# Patient Record
Sex: FEMALE | Race: WHITE | NOT HISPANIC OR LATINO | Employment: FULL TIME | URBAN - METROPOLITAN AREA
[De-identification: names, ages, dates, MRNs, and addresses within clinical notes are randomized per-mention and may not be internally consistent; named-entity substitution may affect disease eponyms.]

---

## 2021-04-29 ENCOUNTER — TELEPHONE (OUTPATIENT)
Dept: OBGYN CLINIC | Facility: CLINIC | Age: 41
End: 2021-04-29

## 2022-05-20 DIAGNOSIS — Z01.419 ENCOUNTER FOR GYNECOLOGICAL EXAMINATION (GENERAL) (ROUTINE) WITHOUT ABNORMAL FINDINGS: ICD-10-CM

## 2022-05-24 ENCOUNTER — TELEPHONE (OUTPATIENT)
Dept: OBGYN CLINIC | Facility: CLINIC | Age: 42
End: 2022-05-24

## 2022-05-24 RX ORDER — ETONOGESTREL AND ETHINYL ESTRADIOL .12; .015 MG/D; MG/D
RING VAGINAL
Qty: 1 EACH | Refills: 2 | Status: SHIPPED | OUTPATIENT
Start: 2022-05-24 | End: 2022-08-03 | Stop reason: SDUPTHER

## 2022-05-24 NOTE — TELEPHONE ENCOUNTER
Jade called requesting refill of her birth control  Scheduled for well annual in august (8/3/2022) Confirmed pharmacy in chart

## 2022-08-03 ENCOUNTER — ANNUAL EXAM (OUTPATIENT)
Dept: OBGYN CLINIC | Facility: CLINIC | Age: 42
End: 2022-08-03
Payer: COMMERCIAL

## 2022-08-03 VITALS
DIASTOLIC BLOOD PRESSURE: 70 MMHG | SYSTOLIC BLOOD PRESSURE: 96 MMHG | WEIGHT: 188.4 LBS | BODY MASS INDEX: 29.57 KG/M2 | HEIGHT: 67 IN

## 2022-08-03 DIAGNOSIS — Z12.31 BREAST CANCER SCREENING BY MAMMOGRAM: ICD-10-CM

## 2022-08-03 DIAGNOSIS — Z12.4 ENCOUNTER FOR PAPANICOLAOU SMEAR FOR CERVICAL CANCER SCREENING: ICD-10-CM

## 2022-08-03 DIAGNOSIS — Z01.419 ENCOUNTER FOR GYNECOLOGICAL EXAMINATION WITHOUT ABNORMAL FINDING: Primary | ICD-10-CM

## 2022-08-03 DIAGNOSIS — Z30.011 ENCOUNTER FOR PRESCRIPTION OF ORAL CONTRACEPTIVES: ICD-10-CM

## 2022-08-03 PROCEDURE — S0612 ANNUAL GYNECOLOGICAL EXAMINA: HCPCS | Performed by: NURSE PRACTITIONER

## 2022-08-03 RX ORDER — ETONOGESTREL AND ETHINYL ESTRADIOL 11.7; 2.7 MG/1; MG/1
1 INSERT, EXTENDED RELEASE VAGINAL
Qty: 3 EACH | Refills: 3 | Status: SHIPPED | OUTPATIENT
Start: 2022-08-03

## 2022-08-03 RX ORDER — POTASSIUM CITRATE 15 MEQ/1
1 TABLET, EXTENDED RELEASE ORAL 2 TIMES DAILY
COMMUNITY
Start: 2022-07-18

## 2022-08-03 NOTE — PROGRESS NOTES
Assessment/Plan:  Calcium 1000 mg + 600-1000 IU Vit D daily  Pap with high risk HPV Q 3-5 years,  Annual mammogram, monthly breast self exam  Birth control as directed  Diagnoses and all orders for this visit:    Encounter for gynecological examination without abnormal finding  -     Cancel: IGP, Aptima HPV, Rfx 16/18,45  -     IGP, Aptima HPV, Rfx 16/18,45    Breast cancer screening by mammogram  -     Mammo screening bilateral w 3d & cad; Future    Encounter for prescription of oral contraceptives  -     etonogestrel-ethinyl estradiol (EluRyng) 0 12-0 015 MG/24HR vaginal ring; Insert 1 each into the vagina every 28 days Insert vaginally and leave in place for 3 consecutive weeks, then remove for 1 week  Encounter for Papanicolaou smear for cervical cancer screening  -     Cancel: IGP, Aptima HPV, Rfx 16/18,45  -     IGP, Aptima HPV, Rfx 16/18,45    Other orders  -     Potassium Citrate ER 15 MEQ (1620 MG) TBCR; Take 1 tablet by mouth 2 (two) times a day          Subjective:      Patient ID: Huber Hong is a 37 y o  female  Here for annual gyn Nuva ring PAP 7/2018 neg Periods monthly Still heavy and cramps No pain other times of the month No unusual discharge Not currently SA  The following portions of the patient's history were reviewed and updated as appropriate: allergies, current medications, past family history, past medical history, past social history, past surgical history and problem list     Review of Systems   Constitutional: Negative for fatigue and unexpected weight change  Gastrointestinal: Negative for abdominal distention, abdominal pain, constipation and diarrhea  Genitourinary: Negative for difficulty urinating, dyspareunia, dysuria, frequency, genital sores, menstrual problem, pelvic pain, urgency, vaginal bleeding, vaginal discharge and vaginal pain  Neurological: Negative for headaches  Psychiatric/Behavioral: Negative  Negative for dysphoric mood   The patient is not nervous/anxious  Objective:      BP 96/70 (BP Location: Left arm, Patient Position: Sitting, Cuff Size: Large)   Ht 5' 6 75" (1 695 m)   Wt 85 5 kg (188 lb 6 4 oz)   LMP 07/23/2022 (Exact Date)   Breastfeeding No   BMI 29 73 kg/m²          Physical Exam  Vitals and nursing note reviewed  Constitutional:       General: She is not in acute distress  Appearance: Normal appearance  HENT:      Head: Normocephalic and atraumatic  Pulmonary:      Effort: Pulmonary effort is normal    Chest:   Breasts: Breasts are symmetrical       Right: Normal  No mass, nipple discharge, skin change, tenderness or axillary adenopathy  Left: Normal  No mass, nipple discharge, skin change, tenderness or axillary adenopathy  Abdominal:      General: There is no distension  Palpations: Abdomen is soft  Tenderness: There is no abdominal tenderness  There is no guarding or rebound  Genitourinary:     General: Normal vulva  Exam position: Lithotomy position  Labia:         Right: No rash, tenderness, lesion or injury  Left: No rash, tenderness, lesion or injury  Urethra: No prolapse, urethral pain, urethral swelling or urethral lesion  Vagina: Normal  No erythema or lesions  Cervix: No cervical motion tenderness, discharge, lesion or cervical bleeding  Uterus: Normal        Adnexa: Right adnexa normal and left adnexa normal         Right: No mass or tenderness  Left: No mass or tenderness  Rectum: No mass or external hemorrhoid  Comments: PAP from cervix  Musculoskeletal:         General: Normal range of motion  Lymphadenopathy:      Upper Body:      Right upper body: No axillary adenopathy  Left upper body: No axillary adenopathy  Lower Body: No right inguinal adenopathy  No left inguinal adenopathy  Skin:     General: Skin is warm and dry     Neurological:      Mental Status: She is alert and oriented to person, place, and time  Psychiatric:         Mood and Affect: Mood normal          Behavior: Behavior normal          Thought Content:  Thought content normal          Judgment: Judgment normal

## 2022-08-03 NOTE — PATIENT INSTRUCTIONS
Calcium 1000 mg + 600-1000 IU Vit D daily  Pap with high risk HPV Q 3-5 years,  Annual mammogram, monthly breast self exam  Birth control as directed

## 2022-08-08 LAB
CYTOLOGIST CVX/VAG CYTO: NORMAL
DX ICD CODE: NORMAL
HPV I/H RISK 4 DNA CVX QL PROBE+SIG AMP: NEGATIVE
OTHER STN SPEC: NORMAL
PATH REPORT.FINAL DX SPEC: NORMAL
SL AMB NOTE:: NORMAL
SL AMB SPECIMEN ADEQUACY: NORMAL
SL AMB TEST METHODOLOGY: NORMAL

## 2022-08-17 ENCOUNTER — HOSPITAL ENCOUNTER (OUTPATIENT)
Dept: MAMMOGRAPHY | Facility: CLINIC | Age: 42
Discharge: HOME | End: 2022-08-17
Payer: COMMERCIAL

## 2022-08-17 VITALS — HEIGHT: 65 IN | WEIGHT: 187 LBS | BODY MASS INDEX: 31.16 KG/M2

## 2022-08-17 DIAGNOSIS — Z12.31 BREAST CANCER SCREENING BY MAMMOGRAM: ICD-10-CM

## 2022-08-17 PROCEDURE — 77067 SCR MAMMO BI INCL CAD: CPT

## 2022-08-17 PROCEDURE — 77063 BREAST TOMOSYNTHESIS BI: CPT

## 2023-01-03 ENCOUNTER — TELEPHONE (OUTPATIENT)
Dept: OBGYN CLINIC | Facility: CLINIC | Age: 43
End: 2023-01-03

## 2023-01-03 NOTE — TELEPHONE ENCOUNTER
Jade states was treated for an UTI approx  2-3 weeks ago;[facility]  She also has noted some brownish color vaginal discharge,been using Nuvaring continuously for at the past two months  Jade is still having some urinary frequency and discomfort  Jade is requesting an appt  To be checked,if has a vaginal infection  Sent message to  to schedule pt appt  Analisa Churchill

## 2023-01-05 ENCOUNTER — OFFICE VISIT (OUTPATIENT)
Dept: OBGYN CLINIC | Facility: CLINIC | Age: 43
End: 2023-01-05

## 2023-01-05 VITALS
WEIGHT: 189.2 LBS | DIASTOLIC BLOOD PRESSURE: 76 MMHG | BODY MASS INDEX: 31.52 KG/M2 | SYSTOLIC BLOOD PRESSURE: 105 MMHG | HEIGHT: 65 IN

## 2023-01-05 DIAGNOSIS — N92.1 BREAKTHROUGH BLEEDING: ICD-10-CM

## 2023-01-05 DIAGNOSIS — R39.15 URINARY URGENCY: ICD-10-CM

## 2023-01-05 DIAGNOSIS — N30.00 ACUTE CYSTITIS WITHOUT HEMATURIA: ICD-10-CM

## 2023-01-05 DIAGNOSIS — R30.0 BURNING WITH URINATION: Primary | ICD-10-CM

## 2023-01-05 DIAGNOSIS — Z11.3 SCREENING EXAMINATION FOR VENEREAL DISEASE: ICD-10-CM

## 2023-01-05 PROBLEM — J34.2 DEVIATED NASAL SEPTUM: Status: ACTIVE | Noted: 2023-01-05

## 2023-01-05 PROBLEM — J32.2 CHRONIC ETHMOIDAL SINUSITIS: Status: ACTIVE | Noted: 2023-01-05

## 2023-01-05 PROBLEM — J34.89 NASAL OBSTRUCTION: Status: ACTIVE | Noted: 2023-01-05

## 2023-01-05 PROBLEM — G47.30 SLEEP APNEA: Status: ACTIVE | Noted: 2023-01-05

## 2023-01-05 PROBLEM — S02.2XXA CLOSED FRACTURE OF NASAL BONES: Status: ACTIVE | Noted: 2023-01-05

## 2023-01-05 RX ORDER — NITROFURANTOIN 25; 75 MG/1; MG/1
100 CAPSULE ORAL 2 TIMES DAILY
Qty: 5 CAPSULE | Refills: 0 | Status: SHIPPED | OUTPATIENT
Start: 2023-01-05

## 2023-01-05 NOTE — PROGRESS NOTES
Assessment/Plan:    Burning with urination  Unable to assess urine with dip today secondary to recent Azo  Will plan to start Macrobid 100mg BID x 5 days for UTI  Urine sent for culture to be sure treating appropriately  Reviewed hydration, cranberry tablets, urinating after intercourse  Genital and STD cultures done today  Breakthrough bleeding  Reviewed currently breakthrough spotting on Nuvaring, likely secondary to being due to remove and taking continuously for the last 3 months  Continue to monitor  Problem List Items Addressed This Visit        Genitourinary    Acute cystitis without hematuria    Relevant Medications    nitrofurantoin (MACROBID) 100 mg capsule       Other    Burning with urination - Primary     Unable to assess urine with dip today secondary to recent Azo  Will plan to start Macrobid 100mg BID x 5 days for UTI  Urine sent for culture to be sure treating appropriately  Reviewed hydration, cranberry tablets, urinating after intercourse  Genital and STD cultures done today  Relevant Orders    Urine culture    Urinalysis with microscopic    Breakthrough bleeding     Reviewed currently breakthrough spotting on Nuvaring, likely secondary to being due to remove and taking continuously for the last 3 months  Continue to monitor  Other Visit Diagnoses     Urinary urgency        Relevant Orders    Urine culture    Screening examination for venereal disease        Relevant Orders    Chlamydia/GC amplified DNA by PCR    Genital Comprehensive Culture            Subjective:      Patient ID: Alexandra Simon is a 40 y o  female  HPI  39 yo seen for pain with urination and vaginal discomfort  Reports 3 5 weeks ago saw urgent care for UTI, was treated with antibiotic, unsure which antibiotic, symptoms seem to improve but have since returned  Currently using Nuvaring for birth control, replaces every 4 weeks, skips menses  Allowed for menses in October 2022   For the past week has had breakthrough brown discharge  Due to remove Nuvaring today  Denies odor, itching  Denies fever or chills  Reports some lower back pain, denies flank pain  Denies increased frequency but having urgency  Burning after urination  Minimal pelvic pain  Picked up Azo at pharmacy, started 2 days ago  Which relieved pain  Admits to having a new sexual partner in the last few months  More sexually active than she has been in the past  Has not had STD testing since this partner  The following portions of the patient's history were reviewed and updated as appropriate:   She  has no past medical history on file  She   Patient Active Problem List    Diagnosis Date Noted   • Chronic ethmoidal sinusitis 01/05/2023   • Closed fracture of nasal bones 01/05/2023   • Deviated nasal septum 01/05/2023   • Nasal obstruction 01/05/2023   • Sleep apnea 01/05/2023   • Acute cystitis without hematuria 01/05/2023   • Burning with urination 01/05/2023   • Breakthrough bleeding 01/05/2023     She  has no past surgical history on file  Her family history includes Allergies in her mother; Coronary artery disease in her maternal grandmother; Diabetes in her father; HIV in her father; Hyperlipidemia in her maternal grandfather; Hypertension in her maternal grandfather and mother; Kidney failure in her maternal grandmother; No Known Problems in her daughter, paternal aunt, paternal aunt, paternal aunt, paternal grandfather, paternal grandmother, and son; Skin cancer in her maternal grandfather; Sleep apnea in her mother  She  reports that she has been smoking  She has never used smokeless tobacco  She reports current alcohol use  She reports that she does not currently use drugs    Current Outpatient Medications   Medication Sig Dispense Refill   • etonogestrel-ethinyl estradiol (EluRyng) 0 12-0 015 MG/24HR vaginal ring Insert 1 each into the vagina every 28 days Insert vaginally and leave in place for 3 consecutive weeks, then remove for 1 week  3 each 3   • nitrofurantoin (MACROBID) 100 mg capsule Take 1 capsule (100 mg total) by mouth 2 (two) times a day 5 capsule 0   • Potassium Citrate ER 15 MEQ (1620 MG) TBCR Take 1 tablet by mouth 2 (two) times a day (Patient not taking: Reported on 1/5/2023)       No current facility-administered medications for this visit  She is allergic to amoxicillin       Review of Systems   Constitutional: Negative for fatigue, fever and unexpected weight change  HENT: Negative for dental problem and sinus pressure  Eyes: Negative for visual disturbance  Respiratory: Negative for cough, shortness of breath and wheezing  Cardiovascular: Negative for chest pain  Gastrointestinal: Negative for abdominal pain, blood in stool, constipation, diarrhea, nausea and vomiting  Endocrine: Negative for polydipsia  Genitourinary: Positive for dysuria, pelvic pain, urgency and vaginal pain  Negative for difficulty urinating, dyspareunia, frequency and hematuria  Musculoskeletal: Negative for arthralgias and back pain  Neurological: Negative for dizziness, seizures, light-headedness and headaches  Psychiatric/Behavioral: Negative for suicidal ideas  The patient is not nervous/anxious  Objective:      /76   Ht 5' 4 75" (1 645 m)   Wt 85 8 kg (189 lb 3 2 oz)   LMP 10/16/2022   BMI 31 73 kg/m²          Physical Exam  Constitutional:       Appearance: Normal appearance  She is well-developed  Neck:      Thyroid: No thyroid mass or thyromegaly  Cardiovascular:      Rate and Rhythm: Normal rate and regular rhythm  Heart sounds: Normal heart sounds  No murmur heard  No friction rub  No gallop  Pulmonary:      Effort: Pulmonary effort is normal  No respiratory distress  Breath sounds: Normal breath sounds  No wheezing or rales  Abdominal:      General: Bowel sounds are normal  There is no distension  Palpations: Abdomen is soft  There is no mass  Tenderness: There is no abdominal tenderness  There is no guarding or rebound  Genitourinary:     Labia:         Right: No rash, tenderness, lesion or injury  Left: No rash, tenderness, lesion or injury  Urethra: No prolapse, urethral pain, urethral swelling or urethral lesion  Vagina: No signs of injury  Vaginal discharge (brown discharge in vaginal vault) present  No erythema, tenderness or bleeding  Cervix: No cervical motion tenderness, discharge or friability  Uterus: Not deviated, not enlarged and not tender  Adnexa:         Right: No mass, tenderness or fullness  Left: No mass, tenderness or fullness  Musculoskeletal:      Cervical back: Neck supple  Lymphadenopathy:      Cervical: No cervical adenopathy  Upper Body:      Right upper body: No supraclavicular adenopathy  Left upper body: No supraclavicular adenopathy  Skin:     General: Skin is warm and dry  Coloration: Skin is not pale  Findings: No erythema or rash  Neurological:      Mental Status: She is alert and oriented to person, place, and time  Psychiatric:         Behavior: Behavior normal          Thought Content:  Thought content normal          Judgment: Judgment normal

## 2023-01-05 NOTE — ASSESSMENT & PLAN NOTE
Reviewed currently breakthrough spotting on Nuvaring, likely secondary to being due to remove and taking continuously for the last 3 months  Continue to monitor

## 2023-01-05 NOTE — ASSESSMENT & PLAN NOTE
Unable to assess urine with dip today secondary to recent Azo  Will plan to start Macrobid 100mg BID x 5 days for UTI  Urine sent for culture to be sure treating appropriately  Reviewed hydration, cranberry tablets, urinating after intercourse  Genital and STD cultures done today

## 2023-01-07 LAB
APPEARANCE UR: ABNORMAL
BACTERIA UR CULT: NORMAL
BACTERIA URNS QL MICRO: ABNORMAL
BILIRUB UR QL STRIP: POSITIVE
CASTS URNS QL MICRO: ABNORMAL /LPF
COLOR UR: ABNORMAL
EPI CELLS #/AREA URNS HPF: ABNORMAL /HPF (ref 0–10)
GLUCOSE UR QL: NEGATIVE
HGB UR QL STRIP: ABNORMAL
KETONES UR QL STRIP: NEGATIVE
LEUKOCYTE ESTERASE UR QL STRIP: ABNORMAL
Lab: NO GROWTH
MICRO URNS: ABNORMAL
NITRITE UR QL STRIP: POSITIVE
PH UR STRIP: 6.5 [PH] (ref 5–7.5)
PROT UR QL STRIP: ABNORMAL
RBC #/AREA URNS HPF: ABNORMAL /HPF (ref 0–2)
SP GR UR: 1.03 (ref 1–1.03)
UROBILINOGEN UR STRIP-ACNC: 1 MG/DL (ref 0.2–1)
WBC #/AREA URNS HPF: ABNORMAL /HPF (ref 0–5)

## 2023-01-09 ENCOUNTER — TELEPHONE (OUTPATIENT)
Dept: OBGYN CLINIC | Facility: CLINIC | Age: 43
End: 2023-01-09

## 2023-01-09 LAB
BACTERIA GENITAL AEROBE CULT: NORMAL
C TRACH RRNA SPEC QL NAA+PROBE: NEGATIVE
Lab: NORMAL
N GONORRHOEA RRNA SPEC QL NAA+PROBE: NEGATIVE

## 2023-01-09 NOTE — TELEPHONE ENCOUNTER
Jade reviewed urine & G/C results on pt portal  Reassured Jade about blood being noted in urine specimen

## 2023-03-06 PROBLEM — N30.00 ACUTE CYSTITIS WITHOUT HEMATURIA: Status: RESOLVED | Noted: 2023-01-05 | Resolved: 2023-03-06

## 2023-07-06 ENCOUNTER — TELEPHONE (OUTPATIENT)
Dept: OBGYN CLINIC | Facility: CLINIC | Age: 43
End: 2023-07-06

## 2023-07-06 NOTE — TELEPHONE ENCOUNTER
Pt has a WA scheduled for 8/22/2023  and is requesting to have additional refill for Nuvaring to cover until seen.   Last WA: 8/3/2022  Preferred Pharm: [facility]  *Pt can wait until 7/10/23 when provider returns to the office

## 2023-08-22 ENCOUNTER — ANNUAL EXAM (OUTPATIENT)
Dept: OBGYN CLINIC | Facility: CLINIC | Age: 43
End: 2023-08-22
Payer: COMMERCIAL

## 2023-08-22 VITALS
BODY MASS INDEX: 30.66 KG/M2 | WEIGHT: 184 LBS | DIASTOLIC BLOOD PRESSURE: 74 MMHG | HEIGHT: 65 IN | SYSTOLIC BLOOD PRESSURE: 110 MMHG

## 2023-08-22 DIAGNOSIS — Z12.31 ENCOUNTER FOR SCREENING MAMMOGRAM FOR MALIGNANT NEOPLASM OF BREAST: ICD-10-CM

## 2023-08-22 DIAGNOSIS — Z30.011 ENCOUNTER FOR PRESCRIPTION OF ORAL CONTRACEPTIVES: ICD-10-CM

## 2023-08-22 DIAGNOSIS — Z01.419 ENCOUNTER FOR GYNECOLOGICAL EXAMINATION WITHOUT ABNORMAL FINDING: Primary | ICD-10-CM

## 2023-08-22 DIAGNOSIS — Z11.3 SCREEN FOR STD (SEXUALLY TRANSMITTED DISEASE): ICD-10-CM

## 2023-08-22 PROCEDURE — S0612 ANNUAL GYNECOLOGICAL EXAMINA: HCPCS | Performed by: NURSE PRACTITIONER

## 2023-08-22 RX ORDER — ETONOGESTREL AND ETHINYL ESTRADIOL 11.7; 2.7 MG/1; MG/1
1 INSERT, EXTENDED RELEASE VAGINAL
Qty: 3 EACH | Refills: 4 | Status: SHIPPED | OUTPATIENT
Start: 2023-08-22

## 2023-08-22 NOTE — PROGRESS NOTES
Assessment/Plan:  Calcium 1000 mg + 600-1000 IU Vit D daily. Pap with high risk HPV Q 3-5 years,  Annual mammogram, monthly breast self exam. Birth control as directed. Benefits, risks and alternatives discussed/reviewed. Exercise 150 minutes per week minimum. Diagnoses and all orders for this visit:    Encounter for gynecological examination without abnormal finding  -     Chlamydia/GC amplified DNA by PCR    Encounter for prescription of oral contraceptives  -     etonogestrel-ethinyl estradiol (EluRyng) 0.12-0.015 MG/24HR vaginal ring; Insert 1 each into the vagina every 28 days Insert vaginally and leave in place for 3 consecutive weeks replace  with new ring    Encounter for screening mammogram for malignant neoplasm of breast  -     Mammo screening bilateral w 3d & cad; Future    Screen for STD (sexually transmitted disease)  -     Chlamydia/GC amplified DNA by PCR    Other orders  -     Liquid-based pap, screening          Subjective:      Patient ID: Stefanie Venegas is a 40 y.o. female. Here for annual gyn Nuva ring PAP 8/2022 neg/neg 7/2018 neg Periods monthly some cramps Will use continuous if has special event  No pain other times of the month No unusual discharge Mammo 8/17/2022 New partner request STD testing       The following portions of the patient's history were reviewed and updated as appropriate: allergies, current medications, past family history, past medical history, past social history, past surgical history and problem list]. Review of Systems   Constitutional: Negative for fatigue and unexpected weight change. Gastrointestinal: Negative for abdominal distention, abdominal pain, constipation and diarrhea. Genitourinary: Negative for difficulty urinating, dyspareunia, dysuria, frequency, genital sores, menstrual problem, pelvic pain, urgency, vaginal bleeding, vaginal discharge and vaginal pain. Neurological: Negative for headaches. Psychiatric/Behavioral: Negative. Negative for dysphoric mood. The patient is not nervous/anxious. Objective:      /74 (BP Location: Right arm, Patient Position: Sitting, Cuff Size: Standard)   Ht 5' 5" (1.651 m)   Wt 83.5 kg (184 lb)   LMP 07/31/2023   BMI 30.62 kg/m²          Physical Exam  Vitals and nursing note reviewed. Constitutional:       General: She is not in acute distress. Appearance: Normal appearance. HENT:      Head: Normocephalic and atraumatic. Pulmonary:      Effort: Pulmonary effort is normal.   Chest:   Breasts:     Breasts are symmetrical.      Right: Normal. No mass, nipple discharge, skin change or tenderness. Left: Normal. No mass, nipple discharge, skin change or tenderness. Abdominal:      General: There is no distension. Palpations: Abdomen is soft. Tenderness: There is no abdominal tenderness. There is no guarding or rebound. Genitourinary:     General: Normal vulva. Exam position: Lithotomy position. Labia:         Right: No rash, tenderness, lesion or injury. Left: No rash, tenderness, lesion or injury. Urethra: No prolapse, urethral pain, urethral swelling or urethral lesion. Vagina: Normal. No erythema or lesions. Cervix: No cervical motion tenderness, discharge, lesion or cervical bleeding. Uterus: Normal.       Adnexa: Right adnexa normal and left adnexa normal.        Right: No mass or tenderness. Left: No mass or tenderness. Rectum: No mass or external hemorrhoid. Comments: G/C from cervix  Musculoskeletal:         General: Normal range of motion. Lymphadenopathy:      Upper Body:      Right upper body: No axillary adenopathy. Left upper body: No axillary adenopathy. Lower Body: No right inguinal adenopathy. No left inguinal adenopathy. Skin:     General: Skin is warm and dry. Neurological:      Mental Status: She is alert and oriented to person, place, and time.    Psychiatric:         Mood and Affect: Mood normal.         Behavior: Behavior normal.         Thought Content:  Thought content normal.         Judgment: Judgment normal.

## 2023-08-22 NOTE — PATIENT INSTRUCTIONS
Calcium 1000 mg + 600-1000 IU Vit D daily. Pap with high risk HPV Q 3-5 years,  Annual mammogram, monthly breast self exam. Birth control as directed. Benefits, risks and alternatives discussed/reviewed. Exercise 150 minutes per week minimum.

## 2023-08-24 LAB
C TRACH RRNA SPEC QL NAA+PROBE: NEGATIVE
N GONORRHOEA RRNA SPEC QL NAA+PROBE: NEGATIVE

## 2024-02-09 ENCOUNTER — HOSPITAL ENCOUNTER (OUTPATIENT)
Dept: MAMMOGRAPHY | Facility: CLINIC | Age: 44
Discharge: HOME | End: 2024-02-09
Payer: COMMERCIAL

## 2024-02-09 VITALS — WEIGHT: 184 LBS | HEIGHT: 65 IN | BODY MASS INDEX: 30.66 KG/M2

## 2024-02-09 DIAGNOSIS — Z12.31 ENCOUNTER FOR SCREENING MAMMOGRAM FOR MALIGNANT NEOPLASM OF BREAST: ICD-10-CM

## 2024-02-09 PROCEDURE — 77063 BREAST TOMOSYNTHESIS BI: CPT

## 2024-02-09 PROCEDURE — 77067 SCR MAMMO BI INCL CAD: CPT

## 2024-08-19 PROBLEM — Z01.419 ENCOUNTER FOR GYNECOLOGICAL EXAMINATION (GENERAL) (ROUTINE) WITHOUT ABNORMAL FINDINGS: Status: ACTIVE | Noted: 2024-08-19

## 2024-08-19 NOTE — PROGRESS NOTES
Assessment/Plan:    Encounter for gynecological examination (general) (routine) without abnormal findings  Here for well check, reports 4 months of dysmenorrhea and clotting.   On Nuvaring, flow 4 days with 2/4 days menorrhagia.     Normal breast and pelvic exams.  Last pap 8/2022 neg/HPV neg; due 2027  Mammo order given, last 2/9/24 BIRADS 1C  Will check TSH, pelvic u/s.   Used progesterone IUD in past, twice with SE and removal.   Will try premenstrual NSAIDs.   Will contact with TSH and u/s results.         Diagnoses and all orders for this visit:    Breast cancer screening by mammogram  -     Mammo screening bilateral w 3d & cad; Future    Encounter for gynecological examination (general) (routine) without abnormal findings    Encounter for prescription of oral contraceptives  -     etonogestrel-ethinyl estradiol (EluRyng) 0.12-0.015 MG/24HR vaginal ring; Insert 1 each into the vagina every 28 days Insert vaginally and leave in place for 3 consecutive weeks replace  with new ring    Thyroid disorder screen  -     TSH + Free T4; Future  -     TSH + Free T4    Dysmenorrhea  -     US pelvis complete w transvaginal; Future    Menorrhagia with regular cycle  -     US pelvis complete w transvaginal; Future    Other orders  -     Biotin 1000 MCG tablet; Take 1,000 mcg by mouth  -     Cyanocobalamin 1000 MCG CAPS; Take 1,000 mcg by mouth daily  -     folic acid (FOLVITE) 1 mg tablet; Take 1 mg by mouth daily          Subjective:      Patient ID: Jade Miller is a 43 y.o. female.    HPI Here for well check.    The following portions of the patient's history were reviewed and updated as appropriate: She  has a past medical history of History of HPV infection (2006), Hypoglycemia, Kidney stone (2022), and Migraine.  She   Patient Active Problem List    Diagnosis Date Noted    Encounter for gynecological examination (general) (routine) without abnormal findings 08/19/2024    Chronic ethmoidal sinusitis 01/05/2023     Closed fracture of nasal bones 2023    Deviated nasal septum 2023    Nasal obstruction 2023    Sleep apnea 2023    Burning with urination 2023    Breakthrough bleeding 2023     She  has a past surgical history that includes  section (2011); [place] tooth extraction; EGD (); and Colonoscopy ().  Her family history includes Allergies in her mother; Coronary artery disease in her maternal grandmother; Diabetes in her father; HIV in her father; Hyperlipidemia in her maternal grandfather; Hypertension in her maternal grandfather and mother; Kidney failure in her maternal grandmother; No Known Problems in her daughter, paternal aunt, paternal aunt, paternal aunt, paternal grandfather, paternal grandmother, and son; Skin cancer in her maternal grandfather; Sleep apnea in her mother.  She  reports that she has quit smoking. Her smoking use included cigarettes. She has never been exposed to tobacco smoke. She has never used smokeless tobacco. She reports current alcohol use. She reports that she does not currently use drugs after having used the following drugs: Cocaine and Methamphetamines.  Current Outpatient Medications   Medication Sig Dispense Refill    Biotin 1000 MCG tablet Take 1,000 mcg by mouth      Cyanocobalamin 1000 MCG CAPS Take 1,000 mcg by mouth daily      etonogestrel-ethinyl estradiol (EluRyng) 0.12-0.015 MG/24HR vaginal ring Insert 1 each into the vagina every 28 days Insert vaginally and leave in place for 3 consecutive weeks replace  with new ring 3 each 4    folic acid (FOLVITE) 1 mg tablet Take 1 mg by mouth daily       No current facility-administered medications for this visit.     She is allergic to amoxicillin..    Review of Systems  +dysmenorrhea  +clotting  No IMB.   No breast masses, nipple discharge or nipple bleeding.  No changes in bladder or bowels, does have some diarrhea and constipation. Followed by GI.   No  "incontinence    Objective:      /72 (BP Location: Left arm, Patient Position: Sitting, Cuff Size: Large)   Ht 5' 4\" (1.626 m)   Wt 85.4 kg (188 lb 3.2 oz)   LMP 07/28/2024 (Exact Date)   BMI 32.30 kg/m²     Declined offered chaperone     Physical Exam    General appearance: no distress, pleasant  Neck: thyroid without nodules or thyromegaly, no palpable adenopathy  Lymph nodes: no palpable adenopathy  Breasts: no masses, nodes or skin changes  Abdomen: soft, non tender, no palpable masses  Pelvic exam: normal external genitalia, urethral meatus normal, vagina without lesions, cervix without lesions, uterus small, non tender, no adnexal masses, non tender  Rectal exam: deferred    "

## 2024-08-23 ENCOUNTER — ANNUAL EXAM (OUTPATIENT)
Dept: OBGYN CLINIC | Facility: CLINIC | Age: 44
End: 2024-08-23
Payer: COMMERCIAL

## 2024-08-23 VITALS
WEIGHT: 188.2 LBS | DIASTOLIC BLOOD PRESSURE: 72 MMHG | SYSTOLIC BLOOD PRESSURE: 110 MMHG | BODY MASS INDEX: 32.13 KG/M2 | HEIGHT: 64 IN

## 2024-08-23 DIAGNOSIS — Z01.419 ENCOUNTER FOR GYNECOLOGICAL EXAMINATION (GENERAL) (ROUTINE) WITHOUT ABNORMAL FINDINGS: Primary | ICD-10-CM

## 2024-08-23 DIAGNOSIS — Z13.29 THYROID DISORDER SCREEN: ICD-10-CM

## 2024-08-23 DIAGNOSIS — N94.6 DYSMENORRHEA: ICD-10-CM

## 2024-08-23 DIAGNOSIS — Z30.011 ENCOUNTER FOR PRESCRIPTION OF ORAL CONTRACEPTIVES: ICD-10-CM

## 2024-08-23 DIAGNOSIS — N92.0 MENORRHAGIA WITH REGULAR CYCLE: ICD-10-CM

## 2024-08-23 DIAGNOSIS — Z12.31 BREAST CANCER SCREENING BY MAMMOGRAM: ICD-10-CM

## 2024-08-23 PROCEDURE — S0612 ANNUAL GYNECOLOGICAL EXAMINA: HCPCS | Performed by: OBSTETRICS & GYNECOLOGY

## 2024-08-23 RX ORDER — ETONOGESTREL AND ETHINYL ESTRADIOL VAGINAL RING .015; .12 MG/D; MG/D
1 RING VAGINAL
Qty: 3 EACH | Refills: 4 | Status: SHIPPED | OUTPATIENT
Start: 2024-08-23

## 2024-08-23 RX ORDER — BIOTIN 1 MG
1000 TABLET ORAL
COMMUNITY

## 2024-08-23 RX ORDER — FOLIC ACID 1 MG/1
1 TABLET ORAL DAILY
COMMUNITY

## 2024-08-23 NOTE — ASSESSMENT & PLAN NOTE
Here for well check, reports 4 months of dysmenorrhea and clotting.   On Nuvaring, flow 4 days with 2/4 days menorrhagia.     Normal breast and pelvic exams.  Last pap 8/2022 neg/HPV neg; due 2027  Mammo order given, last 2/9/24 BIRADS 1C  Will check TSH, pelvic u/s.   Used progesterone IUD in past, twice with SE and removal.   Will try premenstrual NSAIDs.   Will contact with TSH and u/s results.

## 2024-08-23 NOTE — LETTER
August 23, 2024     Kelly Penrose  [address] [facility] [address]  [facility] [address]    Patient: Jade Miller   YOB: 1980   Date of Visit: 8/23/2024       Dear Dr. Penrose:    Jade Miller was in today for her annual gyn exam. Below are my notes for this visit.    If you have questions, please do not hesitate to call me. I look forward to following your patient along with you.         Sincerely,        Ariella Meredith MD        CC: No Recipients    Ariella Meredith MD  8/23/2024 11:26 AM  Sign when Signing Visit  Assessment/Plan:    Encounter for gynecological examination (general) (routine) without abnormal findings  Here for well check, reports 4 months of dysmenorrhea and clotting.   On Nuvaring, flow 4 days with 2/4 days menorrhagia.     Normal breast and pelvic exams.  Last pap 8/2022 neg/HPV neg; due 2027  Mammo order given, last 2/9/24 BIRADS 1C  Will check TSH, pelvic u/s.   Used progesterone IUD in past, twice with SE and removal.   Will try premenstrual NSAIDs.   Will contact with TSH and u/s results.         Diagnoses and all orders for this visit:    Breast cancer screening by mammogram  -     Mammo screening bilateral w 3d & cad; Future    Encounter for gynecological examination (general) (routine) without abnormal findings    Encounter for prescription of oral contraceptives  -     etonogestrel-ethinyl estradiol (EluRyng) 0.12-0.015 MG/24HR vaginal ring; Insert 1 each into the vagina every 28 days Insert vaginally and leave in place for 3 consecutive weeks replace  with new ring    Thyroid disorder screen  -     TSH + Free T4; Future  -     TSH + Free T4    Dysmenorrhea  -     US pelvis complete w transvaginal; Future    Menorrhagia with regular cycle  -     US pelvis complete w transvaginal; Future    Other orders  -     Biotin 1000 MCG tablet; Take 1,000 mcg by mouth  -     Cyanocobalamin 1000 MCG CAPS; Take 1,000 mcg by mouth daily  -     folic acid (FOLVITE) 1 mg tablet; Take 1 mg by  mouth daily          Subjective:      Patient ID: Jade Miller is a 43 y.o. female.    HPI Here for well check.    The following portions of the patient's history were reviewed and updated as appropriate: She  has a past medical history of History of HPV infection (), Hypoglycemia, Kidney stone (), and Migraine.  She   Patient Active Problem List    Diagnosis Date Noted   • Encounter for gynecological examination (general) (routine) without abnormal findings 2024   • Chronic ethmoidal sinusitis 2023   • Closed fracture of nasal bones 2023   • Deviated nasal septum 2023   • Nasal obstruction 2023   • Sleep apnea 2023   • Burning with urination 2023   • Breakthrough bleeding 2023     She  has a past surgical history that includes  section (2011); [place] tooth extraction; EGD (); and Colonoscopy ().  Her family history includes Allergies in her mother; Coronary artery disease in her maternal grandmother; Diabetes in her father; HIV in her father; Hyperlipidemia in her maternal grandfather; Hypertension in her maternal grandfather and mother; Kidney failure in her maternal grandmother; No Known Problems in her daughter, paternal aunt, paternal aunt, paternal aunt, paternal grandfather, paternal grandmother, and son; Skin cancer in her maternal grandfather; Sleep apnea in her mother.  She  reports that she has quit smoking. Her smoking use included cigarettes. She has never been exposed to tobacco smoke. She has never used smokeless tobacco. She reports current alcohol use. She reports that she does not currently use drugs after having used the following drugs: Cocaine and Methamphetamines.  Current Outpatient Medications   Medication Sig Dispense Refill   • Biotin 1000 MCG tablet Take 1,000 mcg by mouth     • Cyanocobalamin 1000 MCG CAPS Take 1,000 mcg by mouth daily     • etonogestrel-ethinyl estradiol (EluRyng) 0.12-0.015 MG/24HR vaginal  "ring Insert 1 each into the vagina every 28 days Insert vaginally and leave in place for 3 consecutive weeks replace  with new ring 3 each 4   • folic acid (FOLVITE) 1 mg tablet Take 1 mg by mouth daily       No current facility-administered medications for this visit.     She is allergic to amoxicillin..    Review of Systems  +dysmenorrhea  +clotting  No IMB.   No breast masses, nipple discharge or nipple bleeding.  No changes in bladder or bowels, does have some diarrhea and constipation. Followed by GI.   No incontinence    Objective:      /72 (BP Location: Left arm, Patient Position: Sitting, Cuff Size: Large)   Ht 5' 4\" (1.626 m)   Wt 85.4 kg (188 lb 3.2 oz)   LMP 07/28/2024 (Exact Date)   BMI 32.30 kg/m²     Declined offered chaperone     Physical Exam    General appearance: no distress, pleasant  Neck: thyroid without nodules or thyromegaly, no palpable adenopathy  Lymph nodes: no palpable adenopathy  Breasts: no masses, nodes or skin changes  Abdomen: soft, non tender, no palpable masses  Pelvic exam: normal external genitalia, urethral meatus normal, vagina without lesions, cervix without lesions, uterus small, non tender, no adnexal masses, non tender  Rectal exam: deferred    "

## 2024-08-23 NOTE — PATIENT INSTRUCTIONS
Please call the office if you do not hear about your results in 10-14 days.     Return to office in one year unless having any problems such as breast changes, bleeding, new persistent pain, new progressive bloating, new problems eating (getting full to quickly) or new constant urinary pressure that does not resolve in one week.

## 2024-09-18 PROBLEM — Z01.419 ENCOUNTER FOR GYNECOLOGICAL EXAMINATION (GENERAL) (ROUTINE) WITHOUT ABNORMAL FINDINGS: Status: RESOLVED | Noted: 2024-08-19 | Resolved: 2024-09-18

## 2024-12-09 ENCOUNTER — TELEPHONE (OUTPATIENT)
Dept: OBGYN CLINIC | Facility: CLINIC | Age: 44
End: 2024-12-09

## 2024-12-09 NOTE — TELEPHONE ENCOUNTER
Patient called requesting refill for etonogestrel-ethinyl estradiol (EluRyng) . Patient made aware medication was refilled on 08/023 for 3 with 4 refills to [facility] pharmacy. Patient instructed to contact the pharmacy to obtain refills of medication. Patient verbalized understanding.

## 2025-03-19 ENCOUNTER — TELEPHONE (OUTPATIENT)
Age: 45
End: 2025-03-19

## 2025-03-19 DIAGNOSIS — Z32.00: Primary | ICD-10-CM

## 2025-03-19 NOTE — TELEPHONE ENCOUNTER
Pt   request  orders  for  pregnancy  blood  test  to be  sent  to [facility]  on [address]  in [place], she said  she  missed her period  and  might  be pregnant

## 2025-03-19 NOTE — TELEPHONE ENCOUNTER
LMP 02/15/25. Jade did mess using up birth control, had unprotected sex. Did two different HPTs with inconclusive results. Is having cramping pains all over the abdomen, not generalized to one location. Jade is requesting [facility] HCG test order. Message sent to Dr. Rod.

## 2025-03-19 NOTE — TELEPHONE ENCOUNTER
Order faxed to patient's preferred [facility] as noted in previous documentation. Called patient and made aware of lab order placed. Patient verbalized understanding.

## 2025-04-12 ENCOUNTER — HOSPITAL ENCOUNTER (OUTPATIENT)
Dept: MAMMOGRAPHY | Facility: CLINIC | Age: 45
Discharge: HOME | End: 2025-04-12
Payer: COMMERCIAL

## 2025-04-12 VITALS — WEIGHT: 188 LBS | HEIGHT: 64 IN | BODY MASS INDEX: 32.1 KG/M2

## 2025-04-12 DIAGNOSIS — Z12.31 BREAST CANCER SCREENING BY MAMMOGRAM: ICD-10-CM

## 2025-04-12 PROCEDURE — 77063 BREAST TOMOSYNTHESIS BI: CPT

## 2025-04-12 PROCEDURE — 77067 SCR MAMMO BI INCL CAD: CPT

## 2025-04-14 ENCOUNTER — RESULTS FOLLOW-UP (OUTPATIENT)
Dept: OBGYN CLINIC | Facility: CLINIC | Age: 45
End: 2025-04-14